# Patient Record
Sex: FEMALE | Race: WHITE | NOT HISPANIC OR LATINO | Employment: FULL TIME | URBAN - METROPOLITAN AREA
[De-identification: names, ages, dates, MRNs, and addresses within clinical notes are randomized per-mention and may not be internally consistent; named-entity substitution may affect disease eponyms.]

---

## 2017-09-06 ENCOUNTER — OFFICE VISIT (OUTPATIENT)
Dept: FAMILY MEDICINE | Facility: CLINIC | Age: 27
End: 2017-09-06
Payer: COMMERCIAL

## 2017-09-06 VITALS
HEART RATE: 76 BPM | BODY MASS INDEX: 40.98 KG/M2 | OXYGEN SATURATION: 100 % | WEIGHT: 246 LBS | DIASTOLIC BLOOD PRESSURE: 80 MMHG | SYSTOLIC BLOOD PRESSURE: 122 MMHG | HEIGHT: 65 IN

## 2017-09-06 DIAGNOSIS — F41.9 ANXIETY: Primary | ICD-10-CM

## 2017-09-06 DIAGNOSIS — Z00.00 PHYSICAL EXAM, ROUTINE: ICD-10-CM

## 2017-09-06 DIAGNOSIS — F32.A DEPRESSION, UNSPECIFIED DEPRESSION TYPE: ICD-10-CM

## 2017-09-06 PROCEDURE — 99203 OFFICE O/P NEW LOW 30 MIN: CPT | Mod: ,,, | Performed by: INTERNAL MEDICINE

## 2017-09-06 PROCEDURE — 3008F BODY MASS INDEX DOCD: CPT | Mod: ,,, | Performed by: INTERNAL MEDICINE

## 2017-09-06 RX ORDER — ESCITALOPRAM OXALATE 10 MG/1
10 TABLET ORAL DAILY
Qty: 30 TABLET | Refills: 1 | Status: SHIPPED | OUTPATIENT
Start: 2017-09-06 | End: 2017-09-27 | Stop reason: ALTCHOICE

## 2017-09-06 NOTE — PATIENT INSTRUCTIONS
Patient completed the PHQ-9 questionnaire with score 17 . Patient will be started on SSRI daily and was informed that symptom relief may take 2-4 weeks. Patient was encouraged to avoid abrupt cessation of medication and was educated on the potential medication side effects. Exercise was encouraged as an adjunctive treatment. Will follow-up 4 weeks.  Advice pt to go to ER if having SI or HI.     Patient has no plans of pregnancy and is currently taking oral contraceptive to prevent pregnancy.  Advised avoidance of alcohol while taking medication prescribed.    Generalized Anxiety Disorder 7-item scale score is 15.      Start taking medication today and take once daily for 4 weeks.  Return in 4 weeks for re-evaluation.  Call for side effects or if unable to tolerate medication.  Depression  Depression is one of the most common mental health problems today. It is not just a state of unhappiness or sadness. It is a true disease. The cause seems to be related to a decrease in chemicals that transmit signals in the brain. Having a family history of depression, alcoholism, or suicide increases the risk. Chronic illness, chronic pain, migraine headaches and high emotional stress also increase the risk.  Depression is something we tend to recognize in others, but may have a hard time seeing in ourselves. It can show in many physical and emotional ways:  · Loss of appetite  · Over-eating  · Not being able to sleep  · Sleeping too much  · Tiredness not related to physical exertion  · Restlessness or irritability  · Slowness of movement or speech  · Feeling depressed or withdrawn  · Loss of interest in things you once enjoyed  · Trouble concentrating, poor memory, trouble making decisions  · Thoughts of harming or killing oneself, or thoughts that life is not worth living  · Low self-esteem  The treatment for depression may include both medicine and psychotherapy. Antidepressants can reduce suffering and can improve the ability  to function during the depressed period. Therapy can offer emotional support and help you understand emotional factors that may be causing the depression.  Home care  · On-going care and support helps people manage this disease.  Find a healthcare provider and therapist who meet your needs. Seek help when you feel like you may be getting ill.  · Be kind to yourself. Make it a point to do things that you enjoy (gardening, walking in nature, going to a movie, etc.). Reward yourself for small successes.  · Take care of your physical body. Eat a balanced diet (low in saturated fat and high in fruits and vegetables). Exercise at least 3 times a week for 30 minutes. Even mild-moderate exercise (like brisk walking) can make you feel better.  · Avoid alcohol, which can make depression worse.  · Take medicine as prescribed.  · Tell each of your healthcare providers about all of the prescription drugs, over-the-counter medicines, vitamins, and supplements you take. Certain supplements interact with medicines and can result in dangerous side effects. Ask your pharmacist when you have questions about drug interactions.  · Talk with your family and trusted friends about your feelings and thoughts. Ask them to help you recognize behavior changes early so you can get help and, if needed, medicine can be adjusted.  Follow-up care  Follow up with your healthcare provider, or as advised.  Call 911  Call 911 if you:  · Have suicidal thoughts, a suicide plan, and the means to carry out the plan  · Have trouble breathing  · Are very confused  · Feel very drowsy or have trouble awakening  · Faint or lose consciousness  · Have new chest pain that becomes more severe, lasts longer, or spreads into your shoulder, arm, neck, jaw or back  When to seek medical advice  Call your healthcare provider right away if any of these occur:  · Feeling extreme depression, fear, anxiety, or anger toward yourself or others  · Feeling out of  control  · Feeling that you may try to harm yourself or another  · Hearing voices that others do not hear  · Seeing things that others do not see  · Cant sleep or eat for 3 days in a row  · Friends or family express concern over your behavior and ask you to seek help  Date Last Reviewed: 9/29/2015  © 6488-4225 [employer]. [address] [place] [address], [place] [address]. All rights reserved. This information is not intended as a substitute for professional medical care. Always follow your healthcare professional's instructions.        Treating Anxiety Disorders with Medication  An anxiety disorder can make you feel nervous or apprehensive, even without a clear reason. Certain anxiety disorders can cause intense feelings of fear or panic. You may even have physical symptoms, such as a racing heartbeat or dizziness. If you have these feelings, you dont have to suffer anymore. Treatment to help you overcome your fears will likely include therapy (also called counseling). Medication may also be prescribed to help control your symptoms.    Medications  Certain medications may be prescribed to help control your symptoms. As a result, you may feel less anxious. You may also feel able to move forward with therapy. At first, medications and dosages may need to be adjusted to find what works best for you. Try to be patient. Tell your health care provider how a medication makes you feel. This way, you can work together to find the treatment thats best for you. Keep in mind that medications can have side effects. Talk to your provider about any side effects that are bothering you. Changing the dose or type of medication may help. Dont stop taking medication on your own because it can cause symptoms to come back.  · Anti-anxiety medication: This medication eases symptoms and helps you relax. Your health care provider will explain when and how to use it. It may be prescribed for use before situations that makes you  anxious. Or, you may be told to take it on a regular schedule. Anti-anxiety medication may make you feel a little sleepy or out of it. Dont drive a car or operate machinery while on this medication, until you know how it affects you.  Caution  Never use alcohol or other drugs with anti-anxiety medications. This could result in loss of muscular control, sedation, coma or death. Also, use only the amount of medication prescribed for you. If you think you may have taken too much, get emergency care right away.   · Antidepressant medication: This kind of medication is often used to treat anxiety, even if you arent depressed. An antidepressant helps balance out brain chemicals. This helps keep anxiety under control. This medication is taken on a schedule. It takes a few weeks to start working. If you dont notice a change at first, you may just need more time. But if you dont notice results after the first few weeks, tell your provider.  Keep taking medications as prescribed  Never change your dosage or stop taking your medications without talking to your health care provider first. Keep the following in mind:  · Some medications must be taken on a schedule. Make this part of your daily routine. For instance, always take your pill before brushing your teeth. A pillbox can help you remember if youve taken your medication each day.  · Medications are often taken for 6 to 12 months. Your health care provider will then evaluate whether you need to stay on them. Many people who have also had therapy may no longer need medication to manage anxiety.  · You may need to stop taking medication slowly to give your body time to adjust. When its time to stop, your health care provider will tell you more. Remember: Never stop taking your medication without talking to your provider first.  · If symptoms return, you may need to start taking medications again. This isnt your fault. Its just the nature of your anxiety  disorder.  Special concerns  · Side effects: Medications may cause side effects. Ask your health care provider or pharmacist what you can expect. They may have ideas for avoiding some side effects.  · Sexual problems: Some antidepressants can affect your desire for sex or your ability to have an orgasm. A change in dosage or medication often solves the problem. If you have a sexual side effect that concerns you, tell your health care provider.  · Addiction: Antidepressants are not addictive. And if youve never had a problem with drugs or alcohol, you likely wont have a problem with anti-anxiety medication. But if you have history of addiction, you may need to avoid this medication.   Date Last Reviewed: 3/27/2015  © 3923-0921 [employer]. [address] [place] [address], [place] [address]. All rights reserved. This information is not intended as a substitute for professional medical care. Always follow your healthcare professional's instructions.

## 2017-09-06 NOTE — PROGRESS NOTES
Subjective:       Patient ID: Stefanie Da Silva is a 26 y.o. female.    Chief Complaint: Establish Care and Anxiety    Anxiety   Presents for initial visit. Onset was more than 5 years ago (Patient voices symptoms have worsened in the last 3 years). The problem has been waxing and waning. Symptoms include irritability and nervous/anxious behavior. Patient reports no chest pain, compulsions, confusion, decreased concentration, depressed mood, dizziness, excessive worry, feeling of choking, hyperventilation, impotence, insomnia, malaise, nausea, palpitations, panic, restlessness, shortness of breath or suicidal ideas. Symptoms occur most days. The most recent episode lasted 20 days. The severity of symptoms is moderate and causing significant distress. The symptoms are aggravated by family issues, social activities and work stress. The patient sleeps 6 hours per night. The quality of sleep is non-restorative. Nighttime awakenings: several.     Risk factors include alcohol intake. Her past medical history is significant for anxiety/panic attacks. There is no history of anemia, arrhythmia, asthma, bipolar disorder, CAD, CHF, chronic lung disease, depression, fibromyalgia, hyperthyroidism or suicide attempts. Past treatments include lifestyle changes (wellbutrin self medication in the past). The treatment provided no relief. Compliance with prior treatments has been variable.     Review of Systems   Constitutional: Positive for irritability. Negative for activity change, appetite change and fever.   HENT: Negative for congestion, ear discharge, ear pain, sore throat, trouble swallowing and voice change.    Eyes: Negative for photophobia, pain, discharge and visual disturbance.   Respiratory: Negative for cough, chest tightness and shortness of breath.    Cardiovascular: Negative for chest pain and palpitations.   Gastrointestinal: Negative for abdominal pain, nausea and vomiting.   Endocrine: Negative for cold intolerance and  heat intolerance.   Genitourinary: Negative for difficulty urinating, dysuria and impotence.   Musculoskeletal: Negative for arthralgias and gait problem.   Skin: Negative for rash.   Allergic/Immunologic: Negative for immunocompromised state.   Neurological: Negative for dizziness, speech difficulty and headaches.   Psychiatric/Behavioral: Positive for agitation and sleep disturbance. Negative for confusion, decreased concentration, self-injury and suicidal ideas. The patient is nervous/anxious. The patient does not have insomnia.        Objective:      Physical Exam   Constitutional: She is oriented to person, place, and time. She appears well-developed and well-nourished. She is cooperative. No distress.   HENT:   Head: Normocephalic and atraumatic.   Right Ear: Tympanic membrane and external ear normal.   Left Ear: Tympanic membrane and external ear normal.   Nose: Nose normal.   Mouth/Throat: Oropharynx is clear and moist.   Eyes: Conjunctivae, EOM and lids are normal. Pupils are equal, round, and reactive to light. Lids are everted and swept, no foreign bodies found. Right pupil is round and reactive. Left pupil is round and reactive.   Neck: Trachea normal and normal range of motion. Neck supple.   Cardiovascular: Normal rate, regular rhythm, S1 normal, S2 normal, normal heart sounds and intact distal pulses.    No murmur heard.  Pulmonary/Chest: Breath sounds normal.   Abdominal: Soft. Bowel sounds are normal. There is no rigidity and no guarding.   Musculoskeletal: Normal range of motion.   Lymphadenopathy:     She has no cervical adenopathy.     She has no axillary adenopathy.   Neurological: She is alert and oriented to person, place, and time.   Skin: Skin is warm and dry. Capillary refill takes less than 2 seconds.   Psychiatric: Her speech is normal and behavior is normal. Judgment and thought content normal. Her mood appears anxious. Cognition and memory are normal. She expresses no suicidal ideation.    Nursing note and vitals reviewed.      Assessment:       1. Anxiety    2. Physical exam, routine    3. Depression, unspecified depression type            Plan:      Medications: Lexapro.  Labs: Comprehensive metabolic profile, CBC, TSH, Vitamin D, and lipid panel.  Instructed patient to contact office or on-call physician promptly should condition worsen or any new symptoms appear and provided on-call telephone numbers. IF THE PATIENT HAS ANY SUICIDAL OR HOMICIDAL IDEATIONS, CALL THE OFFICE, DISCUSS WITH A SUPPORT MEMBER, OR GO TO THE ER IMMEDIATELY. Patient was agreeable with this plan.  Follow up: 4 weeks.  Spent 30 minutes (>50% of visit) discussing the risks of anxiety disorder, the  pathophysiology, etiology, risks, and principles of treatment.

## 2017-09-27 ENCOUNTER — OFFICE VISIT (OUTPATIENT)
Dept: FAMILY MEDICINE | Facility: CLINIC | Age: 27
End: 2017-09-27
Payer: COMMERCIAL

## 2017-09-27 VITALS
OXYGEN SATURATION: 98 % | WEIGHT: 240 LBS | BODY MASS INDEX: 39.99 KG/M2 | SYSTOLIC BLOOD PRESSURE: 120 MMHG | HEART RATE: 84 BPM | DIASTOLIC BLOOD PRESSURE: 82 MMHG | HEIGHT: 65 IN

## 2017-09-27 DIAGNOSIS — F32.A DEPRESSION, UNSPECIFIED DEPRESSION TYPE: ICD-10-CM

## 2017-09-27 DIAGNOSIS — F41.9 ANXIETY: Primary | ICD-10-CM

## 2017-09-27 PROCEDURE — 99213 OFFICE O/P EST LOW 20 MIN: CPT | Mod: ,,, | Performed by: NURSE PRACTITIONER

## 2017-09-27 PROCEDURE — 3008F BODY MASS INDEX DOCD: CPT | Mod: ,,, | Performed by: NURSE PRACTITIONER

## 2017-09-27 RX ORDER — ESCITALOPRAM OXALATE 20 MG/1
20 TABLET ORAL DAILY
Qty: 30 TABLET | Refills: 1 | Status: SHIPPED | OUTPATIENT
Start: 2017-09-27 | End: 2017-10-18 | Stop reason: SDUPTHER

## 2017-09-27 NOTE — PROGRESS NOTES
Subjective:       Patient ID: Stefanie Da Silva is a 27 y.o. female.    Chief Complaint: Anxiety (f/u, feels like Lexapro is not working for her)    Anxiety   Presents for follow-up visit. Symptoms include decreased concentration, depressed mood, excessive worry, irritability, nervous/anxious behavior and shortness of breath (with high anxiety episode). Patient reports no chest pain, compulsions, confusion, dizziness, dry mouth, feeling of choking, hyperventilation, impotence, insomnia, malaise, muscle tension, nausea, obsessions, palpitations, panic, restlessness or suicidal ideas. Symptoms occur most days. The most recent episode lasted 1 hour. The severity of symptoms is moderate. The patient sleeps 8 hours per night. The quality of sleep is good. Nighttime awakenings: occasional.     Compliance with medications is %.     Review of Systems   Constitutional: Positive for irritability. Negative for activity change, appetite change and fever.   HENT: Negative for congestion, ear discharge, ear pain, sore throat, trouble swallowing and voice change.    Eyes: Negative for photophobia, pain, discharge and visual disturbance.   Respiratory: Positive for shortness of breath (with high anxiety episode). Negative for cough and chest tightness.    Cardiovascular: Negative for chest pain and palpitations.   Gastrointestinal: Negative for abdominal pain, nausea and vomiting.   Endocrine: Negative for cold intolerance and heat intolerance.   Genitourinary: Negative for difficulty urinating, dysuria and impotence.   Musculoskeletal: Negative for arthralgias and gait problem.   Skin: Negative for rash.   Allergic/Immunologic: Negative for immunocompromised state.   Neurological: Negative for dizziness, speech difficulty and headaches.   Psychiatric/Behavioral: Positive for agitation, decreased concentration and dysphoric mood. Negative for behavioral problems, confusion, hallucinations, self-injury, sleep disturbance and  "suicidal ideas. The patient is nervous/anxious. The patient does not have insomnia and is not hyperactive.        Past Medical History:   Diagnosis Date    Depression       Past Surgical History:   Procedure Laterality Date    APPENDECTOMY         Family History   Problem Relation Age of Onset    Depression Mother     Diabetes Maternal Grandfather        Social History     Social History    Marital status: Single     Spouse name: N/A    Number of children: N/A    Years of education: N/A     Social History Main Topics    Smoking status: Never Smoker    Smokeless tobacco: Never Used    Alcohol use Yes    Drug use: No    Sexual activity: Yes     Birth control/ protection: OCP     Other Topics Concern    None     Social History Narrative    None       Current Outpatient Prescriptions   Medication Sig Dispense Refill    L-NORGEST/E.ESTRADIOL-E.ESTRAD (AMETHIA ORAL) Take 1 tablet by mouth once daily.      escitalopram oxalate (LEXAPRO) 20 MG tablet Take 1 tablet (20 mg total) by mouth once daily. 30 tablet 1     No current facility-administered medications for this visit.        Review of patient's allergies indicates:  No Known Allergies  Objective:    HPI     Anxiety    Additional comments: f/u, feels like Lexapro is not working for her       Last edited by Alesia Leary LPN on 9/27/2017  1:11 PM. (History)      Blood pressure 120/82, pulse 84, height 5' 5" (1.651 m), weight 108.9 kg (240 lb), SpO2 98 %. Body mass index is 39.94 kg/m².   Physical Exam   Constitutional: She is oriented to person, place, and time. She appears well-developed and well-nourished. She is cooperative. No distress.   HENT:   Head: Normocephalic and atraumatic.   Right Ear: Tympanic membrane normal.   Left Ear: Tympanic membrane normal.   Eyes: Conjunctivae, EOM and lids are normal. Pupils are equal, round, and reactive to light. Lids are everted and swept, no foreign bodies found. Right pupil is round and reactive. Left pupil " is round and reactive.   Neck: Trachea normal and normal range of motion. Neck supple.   Cardiovascular: Normal rate, regular rhythm, S1 normal, S2 normal, normal heart sounds and intact distal pulses.    No murmur heard.  Pulmonary/Chest: Effort normal and breath sounds normal. No respiratory distress.   Abdominal: Soft. Bowel sounds are normal. There is no rigidity and no guarding.   Musculoskeletal: Normal range of motion.   Lymphadenopathy:     She has no cervical adenopathy.     She has no axillary adenopathy.   Neurological: She is alert and oriented to person, place, and time.   Skin: Skin is warm and dry. Capillary refill takes less than 2 seconds.   Psychiatric: Her speech is normal and behavior is normal. Judgment and thought content normal. Her mood appears anxious. Her affect is not angry, not blunt, not labile and not inappropriate. She is not agitated. She exhibits a depressed mood.   Nursing note and vitals reviewed.          Assessment:       1. Anxiety    2. Depression, unspecified depression type        Plan:       Stefanie was seen today for anxiety.    Diagnoses and all orders for this visit:    Anxiety  -     escitalopram oxalate (LEXAPRO) 20 MG tablet; Take 1 tablet (20 mg total) by mouth once daily.    Depression, unspecified depression type  -     escitalopram oxalate (LEXAPRO) 20 MG tablet; Take 1 tablet (20 mg total) by mouth once daily.           Stop taking Lexapro 10 mg and start taking Lexapro 20mg tomorrow.  Take at night time.  Return in 3 weeks for re-evaluation of new dose.

## 2017-09-27 NOTE — PATIENT INSTRUCTIONS
Treating Anxiety Disorders with Medicine  An anxiety disorder can make you feel nervous or apprehensive, even without a clear reason. In people age 65 and older, generalized anxiety disorder is one of the most commonly diagnosed anxiety disorders. Many times it occurs with depression. Certain anxiety disorders can cause intense feelings of fear or panic. You may even have physical symptoms such as a racing heartbeat, sweating, or dizziness. If you have these feelings, you dont have to suffer anymore. Treatment to help you overcome your fears will likely include therapy (also called counseling). Medicine may also be prescribed to help control your symptoms.    Medicines  Certain medicines may be prescribed to help control your symptoms. So you may feel less anxious. You may also feel able to move forward with therapy. At first, medicines and dosages may need to be adjusted to find what works best for you. Try to be patient. Tell your healthcare provider how a medicine makes you feel. This way, you can work together to find the treatment thats best for you. Keep in mind that medicines can have side effects. Talk with your provider about any side effects that are bothering you. Changing the dose or type of medicine may help. Dont stop taking medicine on your own. That can cause symptoms to come back.  · Anti-anxiety medicine. This medicine eases symptoms and helps you relax. Your healthcare provider will explain when and how to use it. It may be prescribed for use before situations that make you anxious. You may also be told to take medicine on a regular schedule. Anti-anxiety medicine may make you feel a little sleepy or out of it. Dont drive a car or operate machinery while on this medicine, until you know how it affects you.  Caution  Never use alcohol or other drugs with anti-anxiety medicines. This could result in loss of muscular control, sedation, coma, or death. Also, use only the amount of medicine  prescribed for you. If you think you may have taken too much, get emergency care right away.   · Antidepressant medicine. This kind of medicine is often used to treat anxiety, even if you arent depressed. An antidepressant helps balance out brain chemicals. This helps keep anxiety under control. This medicine is taken on a schedule. It takes a few weeks to start working. If you dont notice a change at first, you may just need more time. But if you dont notice results after the first few weeks, tell your provider.  Keep taking medicines as prescribed  Never change your dosage, share or use another person's medicine, or stop taking your medicines without talking to your healthcare provider first. Keep the following in mind:  · Some medicines must be taken on a schedule. Make this part of your daily routine. For instance, always take your pill before brushing your teeth. A pillbox can help you remember if youve taken your medicine each day.  · Medicines are often taken for 6 to 12 months. Your healthcare provider will then evaluate whether you need to stay on them. Many people who have also had therapy may no longer need medicine to manage anxiety.  · You may need to stop taking medicine slowly to give your body time to adjust. When its time to stop, your healthcare provider will tell you more. Remember: Never stop taking your medicine without talking to your provider first.  · If symptoms return, you may need to start taking medicines again. This isnt your fault. Its just the nature of your anxiety disorder.  Special concerns  · Side effects. Medicines may cause side effects. Ask your healthcare provider or pharmacist what you can expect. They may have ideas for avoiding some side effects.  · Sexual problems. Some antidepressants can affect your desire for sex or your ability to have an orgasm. A change in dosage or medicine often solves the problem. If you have a sexual side effect that concerns you, tell your  healthcare provider.  · Addiction. If youve never had a problem with drugs or alcohol, you may not have a problem with medicines used to treat anxiety disorders. But always discuss the medicines with your healthcare provider before taking them. If you have a history of addiction, you may not be able to use certain medicines used to treat anxiety disorders.  · Medicine interactions. Always check with your pharmacist before using any over-the-counter medicines, including herbal supplements.   Date Last Reviewed: 5/1/2017 © 2000-2017 [employer]. [address] [place] [address], [place] [address]. All rights reserved. This information is not intended as a substitute for professional medical care. Always follow your healthcare professional's instructions.

## 2017-10-18 ENCOUNTER — OFFICE VISIT (OUTPATIENT)
Dept: FAMILY MEDICINE | Facility: CLINIC | Age: 27
End: 2017-10-18
Payer: MEDICAID

## 2017-10-18 VITALS
SYSTOLIC BLOOD PRESSURE: 124 MMHG | OXYGEN SATURATION: 98 % | DIASTOLIC BLOOD PRESSURE: 82 MMHG | HEART RATE: 88 BPM | BODY MASS INDEX: 39.32 KG/M2 | HEIGHT: 65 IN | WEIGHT: 236 LBS

## 2017-10-18 DIAGNOSIS — F41.9 ANXIETY: ICD-10-CM

## 2017-10-18 DIAGNOSIS — E55.9 VITAMIN D DEFICIENCY: Primary | ICD-10-CM

## 2017-10-18 DIAGNOSIS — F32.A DEPRESSION, UNSPECIFIED DEPRESSION TYPE: ICD-10-CM

## 2017-10-18 PROCEDURE — 99213 OFFICE O/P EST LOW 20 MIN: CPT | Mod: ,,, | Performed by: NURSE PRACTITIONER

## 2017-10-18 RX ORDER — VIT C/E/ZN/COPPR/LUTEIN/ZEAXAN 250MG-90MG
2000 CAPSULE ORAL DAILY
Refills: 0 | COMMUNITY
Start: 2017-10-18

## 2017-10-18 RX ORDER — ESCITALOPRAM OXALATE 20 MG/1
20 TABLET ORAL DAILY
Qty: 30 TABLET | Refills: 5 | Status: SHIPPED | OUTPATIENT
Start: 2017-10-18 | End: 2018-10-18

## 2017-10-18 NOTE — PROGRESS NOTES
Subjective:       Patient ID: Stefanie Da Silva is a 27 y.o. female.    Chief Complaint: Follow-up (3 wk. f/u)    Patient is presenting today for follow up on anxiety and depressed mood.  Lexapro dose increased to 20mg from 10 mg at last visit.  Patient states she feels much better with no feelings of depressed mood that she is unable to overcome.  Patient states she is in a much happier mood and can tell the difference in a positive way.  Patient would also like to review labs results recently drawn.      Anxiety   Presents for follow-up visit. Patient reports no chest pain, compulsions, confusion, decreased concentration, depressed mood, dizziness, dry mouth, excessive worry, feeling of choking, hyperventilation, impotence, insomnia, irritability, malaise, muscle tension, nausea, nervous/anxious behavior, obsessions, palpitations, panic, restlessness, shortness of breath or suicidal ideas. Symptoms occur constantly. Duration: 7-8. The severity of symptoms is mild (greatly improved). The patient sleeps 7 hours (7-8) per night. The quality of sleep is good. Nighttime awakenings: occasional.     Compliance with medications is %.     Review of Systems   Constitutional: Negative for activity change, appetite change, fatigue, fever, irritability and unexpected weight change.   HENT: Negative for congestion, ear discharge, ear pain, sore throat, trouble swallowing and voice change.    Eyes: Negative for photophobia, pain, discharge and visual disturbance.   Respiratory: Negative for cough, chest tightness and shortness of breath.    Cardiovascular: Negative for chest pain and palpitations.   Gastrointestinal: Negative for abdominal pain, nausea and vomiting.   Endocrine: Negative for cold intolerance and heat intolerance.   Genitourinary: Negative for difficulty urinating, dysuria and impotence.   Musculoskeletal: Negative for arthralgias and gait problem.   Skin: Negative for rash.   Allergic/Immunologic: Negative for  "immunocompromised state.   Neurological: Negative for dizziness, speech difficulty and headaches.   Psychiatric/Behavioral: Negative for agitation, behavioral problems, confusion, decreased concentration, dysphoric mood, hallucinations, self-injury, sleep disturbance and suicidal ideas. The patient is not nervous/anxious, does not have insomnia and is not hyperactive.         Anxiety and depression improved       Past Medical History:   Diagnosis Date    Depression       Past Surgical History:   Procedure Laterality Date    APPENDECTOMY         Family History   Problem Relation Age of Onset    Depression Mother     Diabetes Maternal Grandfather        Social History     Social History    Marital status: Single     Spouse name: N/A    Number of children: N/A    Years of education: N/A     Social History Main Topics    Smoking status: Never Smoker    Smokeless tobacco: Never Used    Alcohol use Yes    Drug use: No    Sexual activity: Yes     Birth control/ protection: OCP     Other Topics Concern    Not on file     Social History Narrative    No narrative on file       Current Outpatient Prescriptions   Medication Sig Dispense Refill    escitalopram oxalate (LEXAPRO) 20 MG tablet Take 1 tablet (20 mg total) by mouth once daily. 30 tablet 5    L-NORGEST/E.ESTRADIOL-E.ESTRAD (AMETHIA ORAL) Take 1 tablet by mouth once daily.      cholecalciferol, vitamin D3, 1,000 unit capsule Take 2 capsules (2,000 Units total) by mouth once daily.  0     No current facility-administered medications for this visit.        Review of patient's allergies indicates:  No Known Allergies  Objective:    HPI     Follow-up    Additional comments: 3 wk. f/u       Last edited by Alesia Leary LPN on 10/18/2017 10:23 AM. (History)      Blood pressure 124/82, pulse 88, height 5' 5" (1.651 m), weight 107 kg (236 lb), SpO2 98 %. Body mass index is 39.27 kg/m².   Physical Exam   Constitutional: She is oriented to person, place, and " time. She appears well-developed and well-nourished. She is cooperative. No distress.   HENT:   Head: Normocephalic and atraumatic.   Right Ear: Tympanic membrane normal.   Left Ear: Tympanic membrane normal.   Eyes: Conjunctivae, EOM and lids are normal. Pupils are equal, round, and reactive to light. Lids are everted and swept, no foreign bodies found. Right pupil is round and reactive. Left pupil is round and reactive.   Neck: Trachea normal and normal range of motion. Neck supple.   Cardiovascular: Normal rate, regular rhythm, S1 normal, S2 normal, normal heart sounds and intact distal pulses.    No murmur heard.  Pulmonary/Chest: Effort normal and breath sounds normal. No respiratory distress.   Abdominal: Soft. Bowel sounds are normal. There is no rigidity and no guarding.   Musculoskeletal: Normal range of motion.   Lymphadenopathy:     She has no cervical adenopathy.     She has no axillary adenopathy.   Neurological: She is alert and oriented to person, place, and time.   Skin: Skin is warm and dry. Capillary refill takes less than 2 seconds.   Psychiatric: She has a normal mood and affect. Her speech is normal and behavior is normal. Judgment and thought content normal. Her mood appears not anxious. Her affect is not angry, not blunt, not labile and not inappropriate. She is not agitated. Cognition and memory are normal. She does not exhibit a depressed mood. She expresses no suicidal plans and no homicidal plans.   Nursing note and vitals reviewed.          Assessment:       1. Vitamin D deficiency    2. Anxiety    3. Depression, unspecified depression type        Plan:       Stefanie was seen today for follow-up.    Diagnoses and all orders for this visit:    Vitamin D deficiency  -     cholecalciferol, vitamin D3, 1,000 unit capsule; Take 2 capsules (2,000 Units total) by mouth once daily.  -Reviewed labs at today's visit and vitamin D is low.  Take OTC vitamin D and will recheck in 6 months.    Anxiety  -      escitalopram oxalate (LEXAPRO) 20 MG tablet; Take 1 tablet (20 mg total) by mouth once daily.  -Continue this medication as prescribed.  Follow-up in 6 months or sooner as needed for complications or questions.    Depression, unspecified depression type  -     escitalopram oxalate (LEXAPRO) 20 MG tablet; Take 1 tablet (20 mg total) by mouth once daily.  - Patient is taking SSRI daily and was informed that symptom relief may take 2-4 weeks. Patient was encouraged to avoid abrupt cessation of medication and was educated on the potential medication side effects. Exercise was encouraged as an adjunctive treatment. Will follow-up 4 weeks.  Advice pt to go to ER if having SI or HI.

## 2018-09-18 DIAGNOSIS — F41.9 ANXIETY: ICD-10-CM

## 2018-09-18 DIAGNOSIS — F32.A DEPRESSION, UNSPECIFIED DEPRESSION TYPE: ICD-10-CM

## 2018-09-20 RX ORDER — ESCITALOPRAM OXALATE 20 MG/1
TABLET ORAL
Qty: 30 TABLET | Refills: 0 | OUTPATIENT
Start: 2018-09-20
